# Patient Record
Sex: MALE | Race: WHITE
[De-identification: names, ages, dates, MRNs, and addresses within clinical notes are randomized per-mention and may not be internally consistent; named-entity substitution may affect disease eponyms.]

---

## 2017-08-14 ENCOUNTER — LABORATORY RESULT (OUTPATIENT)
Age: 57
End: 2017-08-14

## 2017-08-14 ENCOUNTER — APPOINTMENT (OUTPATIENT)
Dept: DERMATOLOGY | Facility: CLINIC | Age: 57
End: 2017-08-14
Payer: COMMERCIAL

## 2017-08-14 PROCEDURE — 99202 OFFICE O/P NEW SF 15 MIN: CPT | Mod: 25

## 2017-08-14 PROCEDURE — 11100 BX SKIN SUBCUTANEOUS&/MUCOUS MEMBRANE 1 LESION: CPT | Mod: 59

## 2017-08-14 PROCEDURE — 17110 DESTRUCTION B9 LES UP TO 14: CPT

## 2020-01-16 ENCOUNTER — RESULT REVIEW (OUTPATIENT)
Age: 60
End: 2020-01-16

## 2020-01-17 ENCOUNTER — APPOINTMENT (OUTPATIENT)
Dept: DERMATOLOGY | Facility: CLINIC | Age: 60
End: 2020-01-17
Payer: COMMERCIAL

## 2020-01-17 PROCEDURE — 17110 DESTRUCTION B9 LES UP TO 14: CPT

## 2020-01-17 PROCEDURE — 11102 TANGNTL BX SKIN SINGLE LES: CPT | Mod: 59

## 2020-01-17 PROCEDURE — 99214 OFFICE O/P EST MOD 30 MIN: CPT | Mod: 25

## 2023-09-27 ENCOUNTER — OUTPATIENT (OUTPATIENT)
Dept: OUTPATIENT SERVICES | Facility: HOSPITAL | Age: 63
LOS: 1 days | End: 2023-09-27
Payer: COMMERCIAL

## 2023-09-27 ENCOUNTER — APPOINTMENT (OUTPATIENT)
Dept: UROLOGY | Facility: CLINIC | Age: 63
End: 2023-09-27
Payer: COMMERCIAL

## 2023-09-27 ENCOUNTER — TRANSCRIPTION ENCOUNTER (OUTPATIENT)
Age: 63
End: 2023-09-27

## 2023-09-27 ENCOUNTER — APPOINTMENT (OUTPATIENT)
Dept: CT IMAGING | Facility: IMAGING CENTER | Age: 63
End: 2023-09-27
Payer: COMMERCIAL

## 2023-09-27 VITALS — SYSTOLIC BLOOD PRESSURE: 124 MMHG | DIASTOLIC BLOOD PRESSURE: 81 MMHG | HEART RATE: 68 BPM

## 2023-09-27 DIAGNOSIS — N20.0 CALCULUS OF KIDNEY: ICD-10-CM

## 2023-09-27 DIAGNOSIS — D35.2 BENIGN NEOPLASM OF PITUITARY GLAND: ICD-10-CM

## 2023-09-27 DIAGNOSIS — N40.1 BENIGN PROSTATIC HYPERPLASIA WITH LOWER URINARY TRACT SYMPMS: ICD-10-CM

## 2023-09-27 DIAGNOSIS — H33.101 UNSPECIFIED RETINOSCHISIS, RIGHT EYE: ICD-10-CM

## 2023-09-27 DIAGNOSIS — R35.0 BENIGN PROSTATIC HYPERPLASIA WITH LOWER URINARY TRACT SYMPMS: ICD-10-CM

## 2023-09-27 PROCEDURE — 99215 OFFICE O/P EST HI 40 MIN: CPT

## 2023-09-27 PROCEDURE — 74176 CT ABD & PELVIS W/O CONTRAST: CPT | Mod: 26

## 2023-09-27 PROCEDURE — 74176 CT ABD & PELVIS W/O CONTRAST: CPT

## 2023-09-28 LAB
APPEARANCE: CLEAR
BACTERIA: NEGATIVE /HPF
BILIRUBIN URINE: NEGATIVE
BLOOD URINE: NEGATIVE
CAST: 0 /LPF
COLOR: YELLOW
EPITHELIAL CELLS: 0 /HPF
GLUCOSE QUALITATIVE U: NEGATIVE MG/DL
KETONES URINE: NEGATIVE MG/DL
LEUKOCYTE ESTERASE URINE: NEGATIVE
MICROSCOPIC-UA: NORMAL
NITRITE URINE: NEGATIVE
PH URINE: 8
PROTEIN URINE: NEGATIVE MG/DL
RED BLOOD CELLS URINE: 0 /HPF
SPECIFIC GRAVITY URINE: 1.02
UROBILINOGEN URINE: 1 MG/DL
WHITE BLOOD CELLS URINE: 0 /HPF

## 2023-10-02 LAB — BACTERIA UR CULT: NORMAL

## 2023-10-17 ENCOUNTER — APPOINTMENT (OUTPATIENT)
Dept: UROLOGY | Facility: CLINIC | Age: 63
End: 2023-10-17

## 2024-03-07 ENCOUNTER — APPOINTMENT (OUTPATIENT)
Dept: ORTHOPEDIC SURGERY | Facility: CLINIC | Age: 64
End: 2024-03-07
Payer: COMMERCIAL

## 2024-03-07 VITALS
OXYGEN SATURATION: 98 % | WEIGHT: 250 LBS | DIASTOLIC BLOOD PRESSURE: 89 MMHG | HEART RATE: 70 BPM | BODY MASS INDEX: 33.13 KG/M2 | SYSTOLIC BLOOD PRESSURE: 147 MMHG | HEIGHT: 73 IN

## 2024-03-07 PROCEDURE — 99244 OFF/OP CNSLTJ NEW/EST MOD 40: CPT

## 2024-03-07 NOTE — REVIEW OF SYSTEMS
[Fever] : no fever [Joint Pain] : joint pain [Joint Stiffness] : joint stiffness [Joint Swelling] : joint swelling [Nl] : Hematologic/Lymphatic

## 2024-03-07 NOTE — DISCUSSION/SUMMARY
[All Questions Answered] : Patient (and family) had all questions answered to an agreeable level of satisfaction [Interested in Proceeding] : Patient (and family) expressed understanding and interest in proceeding with the plan as outlined [de-identified] : Patient has a benign-appearing incidentally found lesion in his left proximal fibula.  This is not the source of his pain and he is not at all tender here.  This looks benign and not aggressive nor does it look active with the lack of any edema or cortical problems.  I would follow this with x-rays.  I will see him again in 3 months with repeat x-rays of his left knee.  He is free to have injections and other treatment inside the knee as necessary.  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  Follow-up again in 3 months.    If imaging or pathology/biopsy was ordered, the patient was told to make an appointment to review findings right after all imaging is completed.  We discussed risks, benefits and alternatives. Rationale of care was reviewed and all questions were answered. Patient (and family) had all questions answered to her degree of the level of satisfaction. Patient (and family) expressed understanding and interest in proceeding with the plan as outlined.     This note was done with a voice recognition transcription software and any typos are related to this rather than medical error. Surgical risks reviewed. Patient (and family) had all questions answered to an agreeable level of satisfaction. Patient (and family) expressed understanding and interest in proceeding with the plan as outlined.

## 2024-03-07 NOTE — HISTORY OF PRESENT ILLNESS
[Stable] : stable [FreeTextEntry1] : This is a 63-year-old gentleman who in general has been doing well however occasionally had some left knee pain.  He was on his elliptical about a month and a half ago and started having significant pain on the medial side of his knee.  While it usually would get better quickly he said this did not get better and is still bothering him.  It is anteromedial in his knee as well as at the medial joint line.  He also feels fullness in the knee.  He has done physical therapy with some mild relief.  He feels like he still cannot get it fully extended.  On imaging at the orthopedist office he was found to have a lesion in his proximal fibula.  He has no pain over this area.  He does not know of anything like this before.  Of note the patient also has some hip pain and was found to have hip arthritis in the past.  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 [___ wks] : [unfilled] week(s) ago [3] : currently ~his/her~ pain is 3 out of 10 [Bending] : worsened by bending [Direct Pressure] : worsened by direct pressure [Walking] : worsened by walking [None] : No relieving factors are noted Pt seen for HgbA1c 9.0%

## 2024-03-07 NOTE — PHYSICAL EXAM
[FreeTextEntry1] : On exam the patient stands in good balance.  He is able to walk around appropriately with full range of motion.  His knee has range of motion from 3 degrees to 120 degrees with some pain and effusion.  He has tightness throughout the superior patellar area.  He has tenderness palpation of the medial side and medial joint line.  He has no proximal fibular pain or tenderness.  He has no skin lesions.  He has no popliteal or inguinal lymphadenopathy and is otherwise neurovascularly intact. [General Appearance - Well Nourished] : well nourished [General Appearance - Well-Appearing] : Well appearing [Oriented To Time, Place, And Person] : Oriented to person, place, and time [Sclera] : the sclera and conjunctiva were normal [Neck Cervical Mass (___cm)] : no neck mass was observed [Heart Rate And Rhythm] : heart rate was normal and rhythm regular [] : No respiratory distress [Normal Station and Gait] : gait and station were normal [Tenderness] : tenderness [Swelling] : no swelling [Skin Changes - Describe changes:] : No skin changes noted [Full ROM Unless otherwise noted:] : Full range of motion unless otherwise noted: [LE  Motor Strength Normal unless otherwise noted:] : 5/5 strength in bilateral lower extemities unless otherwise noted. [Normal] : Sensation intact to light touch.

## 2024-03-07 NOTE — DATA REVIEWED
[Imaging Present] : Present [de-identified] : X-rays done February 14, 2024 shows some minimal medial joint line narrowing.  There is also effusion seen.  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  MRI scan from February 28 2024 shows moderate to large joint effusion with tear of the posterior horn of the medial meniscus with secondary medial extrusion of the meniscal body and degeneration.  There is degenerative changes in the medial compartment.  There is also a 3 and half centimeter T2 hyperintense lesion in the proximal fibula with no nora cortical destruction periosteal edema associated soft tissue areas.  There is also no bone edema.  This seems consistent with cartilage tumor.

## 2024-03-07 NOTE — CONSULT LETTER
[Dear  ___] : Dear  [unfilled], [FreeTextEntry2] : Herve Ragsdale  [facility] [address] [place] [address] [FreeTextEntry1] :  After evaluating your patient and reviewing the studies presented we have come to a mutually agreeable plan.   Please see my note below.  Should you have further questions, please feel free to call and discuss the care of your patient.  Thank you for the confidence of your referral.  Sincerely,   Tato Keyes MD  Chief, Musculoskeletal Oncology   [phone]

## 2024-06-06 ENCOUNTER — APPOINTMENT (OUTPATIENT)
Dept: ORTHOPEDIC SURGERY | Facility: CLINIC | Age: 64
End: 2024-06-06
Payer: COMMERCIAL

## 2024-06-06 DIAGNOSIS — M89.9 DISORDER OF BONE, UNSPECIFIED: ICD-10-CM

## 2024-06-06 PROCEDURE — 73562 X-RAY EXAM OF KNEE 3: CPT | Mod: LT

## 2024-06-06 PROCEDURE — 99213 OFFICE O/P EST LOW 20 MIN: CPT

## 2024-06-06 NOTE — DISCUSSION/SUMMARY
[All Questions Answered] : Patient (and family) had all questions answered to an agreeable level of satisfaction [Interested in Proceeding] : Patient (and family) expressed understanding and interest in proceeding with the plan as outlined [de-identified] : Patient has apparent cartilage lesion in the proximal fibula that is unchanging.  Recommendations to continue watching with x-rays in 6 months it has been already 6 months since this started.  Starts having pain in the back or swelling I can see him sooner otherwise I will follow-up with repeat imaging to look for stability.  If imaging or pathology/biopsy was ordered, the patient was told to make an appointment to review findings right after all imaging is completed.  We discussed risks, benefits and alternatives. Rationale of care was reviewed and all questions were answered. Patient (and family) had all questions answered to her degree of the level of satisfaction. Patient (and family) expressed understanding and interest in proceeding with the plan as outlined.     This note was done with a voice recognition transcription software and any typos are related to this rather than medical error. Surgical risks reviewed. Patient (and family) had all questions answered to an agreeable level of satisfaction. Patient (and family) expressed understanding and interest in proceeding with the plan as outlined.

## 2024-06-06 NOTE — HISTORY OF PRESENT ILLNESS
[FreeTextEntry1] : Patient recently had PRP in his left knee and some physical therapy.  He still cannot fully extend selexipag.  He is not sure whether it has gotten better.  He has no pain over his fibula but is here to check out the mass. [Stable] : stable [___ wks] : [unfilled] week(s) ago [3] : currently ~his/her~ pain is 3 out of 10 [Bending] : worsened by bending [Direct Pressure] : worsened by direct pressure [Walking] : worsened by walking [None] : No relieving factors are noted

## 2024-06-06 NOTE — PHYSICAL EXAM
[FreeTextEntry1] : On exam the patient stands in good balance.  He is able to walk around appropriately with full range of motion.  His knee has range of motion from 3 degrees to 120 degrees with some pain and effusion.  He has tightness throughout the superior patellar area.  He has tenderness palpation of the medial side and medial joint line.  He has no proximal fibular pain or tenderness.  He has no skin lesions.  He has no popliteal or inguinal lymphadenopathy and is otherwise neurovascularly intact. [General Appearance - Well-Appearing] : Well appearing [General Appearance - Well Nourished] : well nourished [Oriented To Time, Place, And Person] : Oriented to person, place, and time [Sclera] : the sclera and conjunctiva were normal [Neck Cervical Mass (___cm)] : no neck mass was observed [Heart Rate And Rhythm] : heart rate was normal and rhythm regular [] : No respiratory distress [Normal Station and Gait] : gait and station were normal [Tenderness] : tenderness [Swelling] : no swelling [Skin Changes - Describe changes:] : No skin changes noted [Full ROM Unless otherwise noted:] : Full range of motion unless otherwise noted: [LE  Motor Strength Normal unless otherwise noted:] : 5/5 strength in bilateral lower extemities unless otherwise noted. [Normal] : Sensation intact to light touch.

## 2024-06-06 NOTE — DATA REVIEWED
[Imaging Present] : Present [de-identified] : X-rays today multiple views of the left knee show a lesion in the proximal fibula.  This appears consistent with cartilage.  There is no bony destruction.  There are small areas of lucency along the posterior cortex of a few millimeters however this is unchanged from November.

## 2024-12-11 ENCOUNTER — APPOINTMENT (OUTPATIENT)
Dept: ORTHOPEDIC SURGERY | Facility: CLINIC | Age: 64
End: 2024-12-11
Payer: COMMERCIAL

## 2024-12-11 DIAGNOSIS — M89.9 DISORDER OF BONE, UNSPECIFIED: ICD-10-CM

## 2024-12-11 PROCEDURE — 73562 X-RAY EXAM OF KNEE 3: CPT | Mod: 50

## 2024-12-11 PROCEDURE — 99214 OFFICE O/P EST MOD 30 MIN: CPT

## 2025-01-29 ENCOUNTER — APPOINTMENT (OUTPATIENT)
Dept: DERMATOLOGY | Facility: CLINIC | Age: 65
End: 2025-01-29
Payer: COMMERCIAL

## 2025-01-29 PROCEDURE — 17110 DESTRUCTION B9 LES UP TO 14: CPT

## 2025-01-29 PROCEDURE — 11102 TANGNTL BX SKIN SINGLE LES: CPT | Mod: 59

## 2025-01-29 PROCEDURE — 99203 OFFICE O/P NEW LOW 30 MIN: CPT | Mod: 25
